# Patient Record
Sex: FEMALE | Race: OTHER | HISPANIC OR LATINO
[De-identification: names, ages, dates, MRNs, and addresses within clinical notes are randomized per-mention and may not be internally consistent; named-entity substitution may affect disease eponyms.]

---

## 2019-05-29 ENCOUNTER — APPOINTMENT (OUTPATIENT)
Dept: ORTHOPEDIC SURGERY | Facility: CLINIC | Age: 74
End: 2019-05-29
Payer: MEDICARE

## 2019-05-29 DIAGNOSIS — M17.0 BILATERAL PRIMARY OSTEOARTHRITIS OF KNEE: ICD-10-CM

## 2019-05-29 DIAGNOSIS — Z96.651 PRESENCE OF RIGHT ARTIFICIAL KNEE JOINT: ICD-10-CM

## 2019-05-29 PROBLEM — Z00.00 ENCOUNTER FOR PREVENTIVE HEALTH EXAMINATION: Status: ACTIVE | Noted: 2019-05-29

## 2019-05-29 PROCEDURE — 99204 OFFICE O/P NEW MOD 45 MIN: CPT

## 2019-05-29 PROCEDURE — 73562 X-RAY EXAM OF KNEE 3: CPT | Mod: 50

## 2019-05-29 NOTE — HISTORY OF PRESENT ILLNESS
[de-identified] : HERE TODAY FOR LEFT KNEE PAIN. \par \par HAD THE RIGHT KNEE REPLACED 02/19/2016 - SAYS THAT ONE IS OCCASIONALLY PAINFUL, OFTEN FORGETS SHE HAD SURGERY ON THAT KNEE. Patient is here for 2 reasons she is having increasing left knee pain she also like to consider a manipulate the right knee since she has limited flexion.

## 2019-05-29 NOTE — PHYSICAL EXAM
[de-identified] : Physical exam left knee there is some mild medial as well as lateral joint line tenderness range of motion is 0-120°. There is minimal warmth.\par \par Right knee exam patient has a well-healed anterior incision her range of motion is 0-95°. [de-identified] : AP standing individual laterals and sunrise views were obtained the left knee has minimal joint space narrowing on any compartment. The right knee shows a well-positioned a 10 rotating platform knee.

## 2019-05-29 NOTE — DISCUSSION/SUMMARY
[de-identified] : Patient and I had a long discussion about the reasonable risks and benefits of right knee manipulation she would like to move forward.\par \par As far as left knee is concerned we will order Orthovisc injection she'll be notified once they are available for use.

## 2019-06-13 VITALS
RESPIRATION RATE: 16 BRPM | HEART RATE: 78 BPM | DIASTOLIC BLOOD PRESSURE: 81 MMHG | WEIGHT: 147.93 LBS | HEIGHT: 62 IN | TEMPERATURE: 98 F | OXYGEN SATURATION: 99 % | SYSTOLIC BLOOD PRESSURE: 148 MMHG

## 2019-06-14 ENCOUNTER — OUTPATIENT (OUTPATIENT)
Dept: OUTPATIENT SERVICES | Facility: HOSPITAL | Age: 74
LOS: 1 days | Discharge: HOME | End: 2019-06-14
Payer: MEDICARE

## 2019-06-14 ENCOUNTER — APPOINTMENT (OUTPATIENT)
Dept: ORTHOPEDIC SURGERY | Facility: HOSPITAL | Age: 74
End: 2019-06-14
Payer: MEDICARE

## 2019-06-14 VITALS
HEART RATE: 66 BPM | OXYGEN SATURATION: 95 % | RESPIRATION RATE: 18 BRPM | DIASTOLIC BLOOD PRESSURE: 61 MMHG | TEMPERATURE: 98 F | SYSTOLIC BLOOD PRESSURE: 103 MMHG

## 2019-06-14 DIAGNOSIS — Z98.89 OTHER SPECIFIED POSTPROCEDURAL STATES: Chronic | ICD-10-CM

## 2019-06-14 DIAGNOSIS — Z41.9 ENCOUNTER FOR PROCEDURE FOR PURPOSES OTHER THAN REMEDYING HEALTH STATE, UNSPECIFIED: Chronic | ICD-10-CM

## 2019-06-14 DIAGNOSIS — Z90.710 ACQUIRED ABSENCE OF BOTH CERVIX AND UTERUS: Chronic | ICD-10-CM

## 2019-06-14 LAB
GLUCOSE BLDC GLUCOMTR-MCNC: 142 MG/DL — HIGH (ref 70–99)
GLUCOSE BLDC GLUCOMTR-MCNC: 167 MG/DL — HIGH (ref 70–99)

## 2019-06-14 PROCEDURE — 27570 FIXATION OF KNEE JOINT: CPT | Mod: RT

## 2019-06-14 PROCEDURE — 82962 GLUCOSE BLOOD TEST: CPT

## 2019-06-14 RX ORDER — ATORVASTATIN CALCIUM 80 MG/1
1 TABLET, FILM COATED ORAL
Qty: 0 | Refills: 0 | DISCHARGE

## 2019-06-14 RX ORDER — SODIUM CHLORIDE 9 MG/ML
1000 INJECTION, SOLUTION INTRAVENOUS
Refills: 0 | Status: DISCONTINUED | OUTPATIENT
Start: 2019-06-14 | End: 2019-06-14

## 2019-06-14 RX ORDER — METFORMIN HYDROCHLORIDE 850 MG/1
1 TABLET ORAL
Qty: 0 | Refills: 0 | DISCHARGE

## 2019-06-14 RX ORDER — ACETAMINOPHEN WITH CODEINE 300MG-30MG
1 TABLET ORAL
Qty: 15 | Refills: 0
Start: 2019-06-14

## 2019-06-14 RX ORDER — OXYCODONE AND ACETAMINOPHEN 5; 325 MG/1; MG/1
2 TABLET ORAL EVERY 6 HOURS
Refills: 0 | Status: DISCONTINUED | OUTPATIENT
Start: 2019-06-14 | End: 2019-06-14

## 2019-06-14 RX ORDER — FENOFIBRATE,MICRONIZED 130 MG
1 CAPSULE ORAL
Qty: 0 | Refills: 0 | DISCHARGE

## 2019-06-26 ENCOUNTER — APPOINTMENT (OUTPATIENT)
Dept: ORTHOPEDIC SURGERY | Facility: CLINIC | Age: 74
End: 2019-06-26
Payer: MEDICARE

## 2019-06-26 VITALS — WEIGHT: 140 LBS | BODY MASS INDEX: 24.8 KG/M2 | HEIGHT: 63 IN

## 2019-06-26 PROCEDURE — 20611 DRAIN/INJ JOINT/BURSA W/US: CPT | Mod: LT,79

## 2019-06-26 PROCEDURE — 99024 POSTOP FOLLOW-UP VISIT: CPT

## 2019-06-26 RX ORDER — HYALURONATE SODIUM 30 MG/2 ML
30 SYRINGE (ML) INTRAARTICULAR
Qty: 3 | Refills: 0 | Status: ACTIVE | OUTPATIENT
Start: 2019-06-26

## 2019-06-26 NOTE — DISCUSSION/SUMMARY
[de-identified] : We will order Orthovisc injections left knee [facility] for physical therapy for the right followed with injections available for use.

## 2019-06-26 NOTE — HISTORY OF PRESENT ILLNESS
[de-identified] : RIGHT KNEE MANINPULATED ON 06/14/19 \par \par SAYS KNEE IS BENDING BETTER - LEFT KNEE IS PAINFUL AND STIFF.

## 2019-06-26 NOTE — PROCEDURE
[de-identified] : Patient is given a left knee cortisone injection today. ThisWas done under sterile conditions and ultrasound guidance. Patient tolerated procedure well.

## 2019-06-26 NOTE — PHYSICAL EXAM
[de-identified] : Physical exam today her right knee range of motion 0-125°.\par \par Left knee patient's medial joint line tenderness and some warmth and motion 5-120°. Minimal soft tissue swelling no effusion

## 2019-07-09 ENCOUNTER — OTHER (OUTPATIENT)
Age: 74
End: 2019-07-09

## 2019-07-10 ENCOUNTER — OTHER (OUTPATIENT)
Age: 74
End: 2019-07-10

## 2019-07-24 ENCOUNTER — APPOINTMENT (OUTPATIENT)
Dept: ORTHOPEDIC SURGERY | Facility: CLINIC | Age: 74
End: 2019-07-24
Payer: MEDICARE

## 2019-07-24 VITALS — HEIGHT: 63 IN | RESPIRATION RATE: 16 BRPM | BODY MASS INDEX: 24.8 KG/M2 | WEIGHT: 140 LBS

## 2019-07-24 PROCEDURE — 99214 OFFICE O/P EST MOD 30 MIN: CPT | Mod: 25

## 2019-07-24 PROCEDURE — 20611 DRAIN/INJ JOINT/BURSA W/US: CPT | Mod: LT

## 2019-07-24 NOTE — PROCEDURE
[de-identified] : She was given first to the series of 3 left knee Orthovisc injections today. Patient tolerated the procedure well. Procedure was performed under sterile conditions and ultrasound Guidance. \par

## 2019-07-24 NOTE — HISTORY OF PRESENT ILLNESS
[de-identified] : 1st left knee Orthovisc injection of the 3 series. \par Lot no.: [id number]\par Exp date: 2021/01/31

## 2019-07-24 NOTE — PHYSICAL EXAM
[de-identified] : Physical exam today her right knee range of motion 0-125°.\par \par Left knee patient's medial joint line tenderness and some warmth and motion 5-120°. Minimal soft tissue swelling no effusion

## 2019-07-31 ENCOUNTER — APPOINTMENT (OUTPATIENT)
Dept: ORTHOPEDIC SURGERY | Facility: CLINIC | Age: 74
End: 2019-07-31
Payer: MEDICARE

## 2019-07-31 VITALS — WEIGHT: 140 LBS | BODY MASS INDEX: 24.8 KG/M2 | HEIGHT: 63 IN

## 2019-07-31 PROCEDURE — 20611 DRAIN/INJ JOINT/BURSA W/US: CPT | Mod: LT

## 2019-07-31 PROCEDURE — 99214 OFFICE O/P EST MOD 30 MIN: CPT | Mod: 25

## 2019-07-31 NOTE — PHYSICAL EXAM
[de-identified] : Physical exam today her right knee range of motion 0-125°.\par \par Left knee patient's medial joint line tenderness and some warmth and motion 5-120°. Minimal soft tissue swelling no effusion

## 2019-07-31 NOTE — PROCEDURE
[de-identified] : She was given the second  to the series of 3 left knee Orthovisc injections today. Patient tolerated the procedure well. Procedure was performed under sterile conditions and ultrasound Guidance. \par

## 2019-08-07 ENCOUNTER — APPOINTMENT (OUTPATIENT)
Dept: ORTHOPEDIC SURGERY | Facility: CLINIC | Age: 74
End: 2019-08-07
Payer: MEDICARE

## 2019-08-07 VITALS — HEIGHT: 63 IN | WEIGHT: 140 LBS | BODY MASS INDEX: 24.8 KG/M2

## 2019-08-07 PROCEDURE — 99214 OFFICE O/P EST MOD 30 MIN: CPT | Mod: 25

## 2019-08-07 PROCEDURE — 20611 DRAIN/INJ JOINT/BURSA W/US: CPT | Mod: LT

## 2019-08-07 NOTE — PROCEDURE
[de-identified] : She was given the third of the series of 3 left knee Orthovisc injections today. Patient tolerated the procedure well. Procedure was performed under sterile conditions and ultrasound Guidance. \par

## 2019-08-07 NOTE — PHYSICAL EXAM
[de-identified] : Physical exam today her right knee range of motion 0-125°.\par \par Left knee patient's medial joint line tenderness and some warmth and motion 5-120°. Minimal soft tissue swelling no effusion

## 2020-08-06 ENCOUNTER — APPOINTMENT (OUTPATIENT)
Dept: ORTHOPEDIC SURGERY | Facility: CLINIC | Age: 75
End: 2020-08-06
Payer: MEDICARE

## 2020-08-06 PROCEDURE — 99214 OFFICE O/P EST MOD 30 MIN: CPT | Mod: 25

## 2020-08-06 PROCEDURE — 20611 DRAIN/INJ JOINT/BURSA W/US: CPT | Mod: LT

## 2020-08-06 PROCEDURE — 73562 X-RAY EXAM OF KNEE 3: CPT | Mod: 50

## 2020-08-06 NOTE — PROCEDURE
[de-identified] : Patient is given a cortisone injection to the lateral compartment left knee today.\par A sterile conditions an ultrasound guidance. The patient tolerated the procedure well.

## 2020-08-06 NOTE — PHYSICAL EXAM
[de-identified] : AP standing into the lateral and sunrise views were obtained showing a well-positioned a 10 rotating platform knee and right. Left knee shows moderate diminishment of medial joint space on standing AP projection. This is reviewed in comparison to right breast than previously. [de-identified] : Left knee definite medial joint line tenderness range of motion 0-125°. Once the soft tissue of the fusion she has good quad toneAnd is neurovascularly intact distally.

## 2020-08-06 NOTE — HISTORY OF PRESENT ILLNESS
[de-identified] : Patient is complaining of severe left knee medial pain is ongoing for a few months but for the last 2 weeks difficult for sleep comfortably. Pain awakens her at night. She recalls no specific accident injury. She is status post right knee replacement

## 2020-08-06 NOTE — DISCUSSION/SUMMARY
[Medication Risks Reviewed] : Medication risks reviewed [de-identified] : Patient may send a Celebrex prescription 200 mg to be taken twice a day for 6 day course and thereafter as needed. Really talked about potential need for knee replacement surgery patient consider this option

## 2020-08-06 NOTE — REASON FOR VISIT
[Follow-Up Visit] : a follow-up visit for [FreeTextEntry2] : LEFT KNEE PAIN - SEVERE - RIGHT KNEE WAS REPLACED - SENT FOR NEW XRAYS

## 2020-08-12 RX ORDER — HYALURONATE SODIUM 30 MG/2 ML
30 SYRINGE (ML) INTRAARTICULAR
Qty: 3 | Refills: 0 | Status: ACTIVE | OUTPATIENT
Start: 2020-08-12

## 2020-08-20 ENCOUNTER — RX RENEWAL (OUTPATIENT)
Age: 75
End: 2020-08-20

## 2020-08-20 RX ORDER — CELECOXIB 200 MG/1
200 CAPSULE ORAL DAILY
Qty: 30 | Refills: 0 | Status: ACTIVE | COMMUNITY
Start: 2020-08-06 | End: 1900-01-01

## 2020-09-15 ENCOUNTER — APPOINTMENT (OUTPATIENT)
Dept: ORTHOPEDIC SURGERY | Facility: CLINIC | Age: 75
End: 2020-09-15
Payer: MEDICARE

## 2020-09-15 DIAGNOSIS — M17.12 UNILATERAL PRIMARY OSTEOARTHRITIS, LEFT KNEE: ICD-10-CM

## 2020-09-15 PROCEDURE — 20610 DRAIN/INJ JOINT/BURSA W/O US: CPT | Mod: LT

## 2020-09-15 PROCEDURE — 99214 OFFICE O/P EST MOD 30 MIN: CPT | Mod: 25

## 2020-09-15 NOTE — HISTORY OF PRESENT ILLNESS
[de-identified] : 75-year-old patient here for evaluation of her left knee. She is a history of a right total knee placement by Dr. Kennedy. She has left knee osteoarthritis. She is here because she wants a cortisone injection for her left knee. She's got injections in the past and they have provided some relief. She says activity makes the left knee pain worse.  Rest makes it better.  \par

## 2020-09-15 NOTE — PHYSICAL EXAM
[de-identified] : General: No acute distress, conversant, well-nourished.\par Head: Normocephalic, atraumatic\par Neck: trachea midline, FROM\par Heart: normotensive and normal rate and rhythm\par Lungs: No labored breathing\par Skin: No abrasions, no rashes, no edema\par Psych: Alert and oriented to person, place and time\par Extremities: no peripheral edema or digital cyanosis\par Gait: Normal gait. Can perform tandem gait.  \par Vascular: warm and well perfused distally, palpable distal pulses\par \par Left Knee with no erythema, no effusion.  \par Tenderness to palpation of knee.\par \par Range of motion: 0 - 120 degrees\par Pain with range of motion.\par \par Stable to varus and valgus stress.\par Negative Lachman's test, negative anterior and posterior drawer tests.  \par \par Sensation intact to light touch.  \par Normal motor exam.\par Warm and well perfused distally. [de-identified] : Bilateral knee AP, lateral, sunrise radiographs (8/6/20): Show right total knee replacement with hardware in appropriate position and alignment.  Left knee with evidence of osteoarthritis.

## 2020-09-15 NOTE — PROCEDURE
[de-identified] : Procedure: Left Knee Joint injection with corticosteroid\par Pre-Procedure Diagnosis: left knee pain\par Post-Procedure Diagnosis: same\par \par The patient was educated about the risks and benefits of a corticosteroid injection.  Alternatives were discussed.  The patient understood and consented for the procedure.\par \par The area was sterilely prepped using isopropyl alcohol.  An ethyl chloride spray provided  local anesthesia.  Using the usual sterile technique, 1 ml of 40mg/1ml of Kenalog and 4 ml of Lidocaine 1% without epinephrine was injected into the joint.  A dressing was applied to the area.  The patient tolerated the procedure well and without complication.\par

## 2020-09-15 NOTE — ASSESSMENT
[FreeTextEntry1] : 75-year-old female with left knee osteoarthritis. She is here for a corticosteroid injection of her left knee. She was given a corticosteroid injection for her left knee. She understands that she cannot have a knee replacement within at least 3 months of a steroid injection. She is awaiting viscosupplementation injections. She does understand she cannot have steroid injections in her knee too often. She'll followup with Dr. Kennedy. She knows to call with any questions or concerns or if her symptoms acutely worsen.

## 2020-10-23 ENCOUNTER — APPOINTMENT (OUTPATIENT)
Dept: ORTHOPEDIC SURGERY | Facility: CLINIC | Age: 75
End: 2020-10-23
Payer: MEDICARE

## 2020-10-23 VITALS — TEMPERATURE: 97.7 F

## 2020-10-23 PROCEDURE — 99214 OFFICE O/P EST MOD 30 MIN: CPT | Mod: 25

## 2020-10-23 PROCEDURE — 99072 ADDL SUPL MATRL&STAF TM PHE: CPT

## 2020-10-23 PROCEDURE — 20611 DRAIN/INJ JOINT/BURSA W/US: CPT | Mod: LT

## 2020-10-23 NOTE — PROCEDURE
[de-identified] : Injection 1/3\par Patient was given an Orthovisc injection 30mg/2ml  in the lateral compartment of the LEFT knee. Injection is performed under sterile conditions with ultrasound guidance. Patient tolerated procedure well. \par

## 2020-10-23 NOTE — PHYSICAL EXAM
[de-identified] : Left knee definite medial joint line tenderness range of motion 0-125°. Once the soft tissue of the fusion she has good quad toneAnd is neurovascularly intact distally. \par

## 2020-10-30 ENCOUNTER — APPOINTMENT (OUTPATIENT)
Dept: ORTHOPEDIC SURGERY | Facility: CLINIC | Age: 75
End: 2020-10-30
Payer: MEDICARE

## 2020-10-30 PROCEDURE — 99072 ADDL SUPL MATRL&STAF TM PHE: CPT

## 2020-10-30 PROCEDURE — 20611 DRAIN/INJ JOINT/BURSA W/US: CPT | Mod: LT

## 2020-10-30 PROCEDURE — 99214 OFFICE O/P EST MOD 30 MIN: CPT | Mod: 25

## 2020-10-30 NOTE — HISTORY OF PRESENT ILLNESS
[de-identified] : Patient is here for the second 3 left knee Orthovisc injection on feel some modest improvement.

## 2020-10-30 NOTE — PHYSICAL EXAM
[de-identified] : Left knee definite medial joint line tenderness range of motion 0-125°. Once the soft tissue of the fusion she has good quad toneAnd is neurovascularly intact distally.

## 2020-10-30 NOTE — PROCEDURE
[de-identified] : Patient was given a second of 3 Orthovisc injections into the left knee today under sterile conditions the lateral joint line. Patient tolerated the procedure well\par

## 2020-11-06 ENCOUNTER — APPOINTMENT (OUTPATIENT)
Dept: ORTHOPEDIC SURGERY | Facility: CLINIC | Age: 75
End: 2020-11-06
Payer: MEDICARE

## 2020-11-06 PROCEDURE — 20611 DRAIN/INJ JOINT/BURSA W/US: CPT | Mod: LT

## 2020-11-06 PROCEDURE — 99214 OFFICE O/P EST MOD 30 MIN: CPT | Mod: 25

## 2020-11-06 PROCEDURE — 99072 ADDL SUPL MATRL&STAF TM PHE: CPT

## 2020-11-06 RX ORDER — METAXALONE 800 MG/1
800 TABLET ORAL 3 TIMES DAILY
Qty: 60 | Refills: 0 | Status: ACTIVE | COMMUNITY
Start: 2020-11-06 | End: 1900-01-01

## 2020-11-06 NOTE — PHYSICAL EXAM
[de-identified] : Left knee definite medial joint line tenderness range of motion 0-125°. Once the soft tissue of the fusion she has good quad toneAnd is neurovascularly intact distally. \par

## 2020-11-06 NOTE — HISTORY OF PRESENT ILLNESS
[de-identified] : Pt is here for 3/3 orthovisc of the left knee. Pt is tolerating the series very well. pt also would like to mention she had BL ankle pain which is being followed by her PCP which she was prescribed meloxicam & Ropinirole for the pain.  [Stable] : stable [3] : a current pain level of 3/10 [Walking] : walking

## 2020-11-06 NOTE — DISCUSSION/SUMMARY
[de-identified] : Pt was scribed skalxin to help with the muscle contractures she is experiencing in her BL feet. \par As per her knee, pt will return as needed for knee pain

## 2020-11-06 NOTE — PROCEDURE
[de-identified] : Injection 3/3\par Patient was given an Orthovisc injection 30mg/2ml  in the lateral compartment of the LEFT knee. Injection is performed under sterile conditions with ultrasound guidance. Patient tolerated procedure well. \par

## 2020-12-13 ENCOUNTER — RX RENEWAL (OUTPATIENT)
Age: 75
End: 2020-12-13

## 2020-12-13 RX ORDER — CYCLOBENZAPRINE HYDROCHLORIDE 10 MG/1
10 TABLET, FILM COATED ORAL
Qty: 30 | Refills: 0 | Status: ACTIVE | COMMUNITY
Start: 2020-11-09 | End: 1900-01-01